# Patient Record
Sex: FEMALE | Race: WHITE | NOT HISPANIC OR LATINO | Employment: PART TIME | URBAN - METROPOLITAN AREA
[De-identification: names, ages, dates, MRNs, and addresses within clinical notes are randomized per-mention and may not be internally consistent; named-entity substitution may affect disease eponyms.]

---

## 2018-11-13 ENCOUNTER — HOSPITAL ENCOUNTER (EMERGENCY)
Facility: HOSPITAL | Age: 18
Discharge: HOME | End: 2018-11-13
Attending: EMERGENCY MEDICINE | Admitting: EMERGENCY MEDICINE

## 2018-11-13 ENCOUNTER — APPOINTMENT (OUTPATIENT)
Dept: CT IMAGING | Facility: HOSPITAL | Age: 18
End: 2018-11-13

## 2018-11-13 VITALS
TEMPERATURE: 97.8 F | DIASTOLIC BLOOD PRESSURE: 81 MMHG | RESPIRATION RATE: 18 BRPM | HEART RATE: 95 BPM | HEIGHT: 66 IN | BODY MASS INDEX: 22.5 KG/M2 | SYSTOLIC BLOOD PRESSURE: 129 MMHG | WEIGHT: 140 LBS | OXYGEN SATURATION: 100 %

## 2018-11-13 DIAGNOSIS — G43.109 MIGRAINE WITH AURA AND WITHOUT STATUS MIGRAINOSUS, NOT INTRACTABLE: Primary | ICD-10-CM

## 2018-11-13 LAB
ALBUMIN SERPL-MCNC: 4.89 G/DL (ref 3.2–4.8)
ALBUMIN/GLOB SERPL: 1.9 G/DL (ref 1.5–2.5)
ALP SERPL-CCNC: 66 U/L (ref 25–100)
ALT SERPL W P-5'-P-CCNC: 62 U/L (ref 7–40)
ANION GAP SERPL CALCULATED.3IONS-SCNC: 9 MMOL/L (ref 3–11)
AST SERPL-CCNC: 39 U/L (ref 0–33)
BASOPHILS # BLD AUTO: 0.03 10*3/MM3 (ref 0–0.2)
BASOPHILS NFR BLD AUTO: 0.3 % (ref 0–1)
BILIRUB SERPL-MCNC: 0.4 MG/DL (ref 0.3–1.2)
BUN BLD-MCNC: 8 MG/DL (ref 9–23)
BUN/CREAT SERPL: 9.8 (ref 7–25)
CALCIUM SPEC-SCNC: 9.7 MG/DL (ref 8.7–10.4)
CHLORIDE SERPL-SCNC: 110 MMOL/L (ref 99–109)
CO2 SERPL-SCNC: 20 MMOL/L (ref 20–31)
CREAT BLD-MCNC: 0.82 MG/DL (ref 0.6–1.3)
DEPRECATED RDW RBC AUTO: 42.4 FL (ref 37–54)
EOSINOPHIL # BLD AUTO: 0.05 10*3/MM3 (ref 0–0.3)
EOSINOPHIL NFR BLD AUTO: 0.5 % (ref 0–3)
ERYTHROCYTE [DISTWIDTH] IN BLOOD BY AUTOMATED COUNT: 14.6 % (ref 11.3–14.5)
GFR SERPL CREATININE-BSD FRML MDRD: 90 ML/MIN/1.73
GLOBULIN UR ELPH-MCNC: 2.5 GM/DL
GLUCOSE BLD-MCNC: 102 MG/DL (ref 70–100)
HCG INTACT+B SERPL-ACNC: <5 MIU/ML
HCT VFR BLD AUTO: 41.5 % (ref 34.5–44)
HGB BLD-MCNC: 12.9 G/DL (ref 11.5–15.5)
HOLD SPECIMEN: NORMAL
HOLD SPECIMEN: NORMAL
IMM GRANULOCYTES # BLD: 0.03 10*3/MM3 (ref 0–0.03)
IMM GRANULOCYTES NFR BLD: 0.3 % (ref 0–0.6)
LYMPHOCYTES # BLD AUTO: 2.09 10*3/MM3 (ref 0.6–4.8)
LYMPHOCYTES NFR BLD AUTO: 18.9 % (ref 24–44)
MCH RBC QN AUTO: 24.7 PG (ref 27–31)
MCHC RBC AUTO-ENTMCNC: 31.1 G/DL (ref 32–36)
MCV RBC AUTO: 79.3 FL (ref 80–99)
MONOCYTES # BLD AUTO: 0.42 10*3/MM3 (ref 0–1)
MONOCYTES NFR BLD AUTO: 3.8 % (ref 0–12)
NEUTROPHILS # BLD AUTO: 8.43 10*3/MM3 (ref 1.5–8.3)
NEUTROPHILS NFR BLD AUTO: 76.2 % (ref 41–71)
PLATELET # BLD AUTO: 328 10*3/MM3 (ref 150–450)
PMV BLD AUTO: 11.3 FL (ref 6–12)
POTASSIUM BLD-SCNC: 4.1 MMOL/L (ref 3.5–5.5)
PROT SERPL-MCNC: 7.4 G/DL (ref 5.7–8.2)
RBC # BLD AUTO: 5.23 10*6/MM3 (ref 3.89–5.14)
SODIUM BLD-SCNC: 139 MMOL/L (ref 132–146)
WBC NRBC COR # BLD: 11.05 10*3/MM3 (ref 4.5–13.5)
WHOLE BLOOD HOLD SPECIMEN: NORMAL
WHOLE BLOOD HOLD SPECIMEN: NORMAL

## 2018-11-13 PROCEDURE — 99283 EMERGENCY DEPT VISIT LOW MDM: CPT

## 2018-11-13 PROCEDURE — 84702 CHORIONIC GONADOTROPIN TEST: CPT | Performed by: EMERGENCY MEDICINE

## 2018-11-13 PROCEDURE — 70450 CT HEAD/BRAIN W/O DYE: CPT

## 2018-11-13 PROCEDURE — 36415 COLL VENOUS BLD VENIPUNCTURE: CPT

## 2018-11-13 PROCEDURE — 85025 COMPLETE CBC W/AUTO DIFF WBC: CPT | Performed by: EMERGENCY MEDICINE

## 2018-11-13 PROCEDURE — 80053 COMPREHEN METABOLIC PANEL: CPT | Performed by: EMERGENCY MEDICINE

## 2018-11-13 RX ORDER — KETOROLAC TROMETHAMINE 10 MG/1
10 TABLET, FILM COATED ORAL EVERY 6 HOURS PRN
Qty: 20 TABLET | Refills: 0 | Status: SHIPPED | OUTPATIENT
Start: 2018-11-13 | End: 2018-11-13 | Stop reason: ALTCHOICE

## 2018-11-13 RX ORDER — ONDANSETRON 4 MG/1
4 TABLET, FILM COATED ORAL ONCE
Status: COMPLETED | OUTPATIENT
Start: 2018-11-13 | End: 2018-11-13

## 2018-11-13 RX ORDER — SODIUM CHLORIDE 0.9 % (FLUSH) 0.9 %
10 SYRINGE (ML) INJECTION AS NEEDED
Status: DISCONTINUED | OUTPATIENT
Start: 2018-11-13 | End: 2018-11-13 | Stop reason: HOSPADM

## 2018-11-13 RX ORDER — METHYLPREDNISOLONE 4 MG/1
TABLET ORAL
Qty: 1 EACH | Refills: 0 | Status: SHIPPED | OUTPATIENT
Start: 2018-11-13 | End: 2021-09-17

## 2018-11-13 RX ORDER — NAPROXEN 500 MG/1
500 TABLET ORAL 2 TIMES DAILY WITH MEALS
Qty: 20 TABLET | Refills: 0 | Status: SHIPPED | OUTPATIENT
Start: 2018-11-13 | End: 2021-09-17

## 2018-11-13 RX ADMIN — ONDANSETRON 4 MG: 4 TABLET, FILM COATED ORAL at 14:52

## 2018-11-13 NOTE — ED PROVIDER NOTES
[facility] EMERGENCY DEPARTMENT    eMERGENCY dEPARTMENT eNCOUnter      Pt Name: Sara Love  MRN: [id number]  YOB: 2000  Date of evaluation: 11/13/2018  Provider: Marvin Waldrop DO    CHIEF COMPLAINT       Chief Complaint   Patient presents with   • Headache         HISTORY OF PRESENT ILLNESS  (Location/Symptom, Timing/Onset, Context/Setting, Quality, Duration, Modifying Factors, Severity.)   Sara Love is a 17 y.o. female who presents to the emergency department with complaints of migraine headaches. The patient states that she has had migraines almost every day for the past month, despite never experiencing migraines prior to the onset one month ago. The headaches are primarily on the right side. She endorses accompanying nausea and vomiting, as well as blurred vision of the left eye with aura. She denies fever, chills, hearing problems, or any illness preceding the migraines. She states that the migraines have had a significant impact on her life and induced stress, causing her to miss classes and social events. She was seen at an urgent treatment center recently and received Zofran and Tramadol. Shortly after, she was treated at  but states that blood was drawn and she was told to return on 11/26/2018 for further evaluation. Today, she had 2 episodes of nausea and vomiting and believed she would be unable to last until that appointment, prompting her to come to the ED today. She denies any other pertinent medical conditions. There are no other acute complaints at this time.      Nursing notes were reviewed.    REVIEW OF SYSTEMS    (2-9 systems for level 4, 10 or more for level 5)   ROS:  General:  No fevers, no chills, no weakness  Cardiovascular:  No chest pain, no palpitations  Respiratory:  No shortness of breath, no cough, no wheezing  Gastrointestinal:  No pain, several episodes of nausea and vomiting, no diarrhea  Musculoskeletal:  No muscle pain, no  "joint pain  Skin:  No rash, no easy bruising  Neurologic:  No speech problems, no extremity numbness, no extremity tingling, no extremity weakness. Migraine headaches. Blurred vision in the left eye with aura  Psychiatric:  No anxiety  Genitourinary:  No dysuria, no hematuria    Except as noted above the remainder of the review of systems was reviewed and negative.       PAST MEDICAL HISTORY   History reviewed. No pertinent past medical history.      SURGICAL HISTORY     History reviewed. No pertinent surgical history.      CURRENT MEDICATIONS       Current Facility-Administered Medications:   •  sodium chloride 0.9 % flush 10 mL, 10 mL, Intravenous, PRN, Marvin Waldrop, DO    Current Outpatient Medications:   •  ketorolac (TORADOL) 10 MG tablet, Take 1 tablet by mouth Every 6 (Six) Hours As Needed for Moderate Pain ., Disp: 20 tablet, Rfl: 0  •  MethylPREDNISolone (MEDROL, DUNCAN,) 4 MG tablet, Take as directed on package instructions., Disp: 1 each, Rfl: 0    ALLERGIES     Penicillins    FAMILY HISTORY     History reviewed. No pertinent family history.       SOCIAL HISTORY       Social History     Socioeconomic History   • Marital status: Single     Spouse name: Not on file   • Number of children: Not on file   • Years of education: Not on file   • Highest education level: Not on file   Tobacco Use   • Smoking status: Never Smoker   Substance and Sexual Activity   • Alcohol use: Yes   • Drug use: Yes     Types: Marijuana   • Sexual activity: Defer         PHYSICAL EXAM    (up to 7 for level 4, 8 or more for level 5)     Vitals:    11/13/18 1324   BP: 129/81   BP Location: Left arm   Patient Position: Sitting   Pulse: 95   Resp: 18   Temp: 97.8 °F (36.6 °C)   TempSrc: Oral   SpO2: 100%   Weight: 63.5 kg (140 lb)   Height: 167.6 cm (66\")       Physical Exam  General :Patient is awake, alert, oriented, in no acute distress, nontoxic appearing  HEENT: Pupils are equally round and reactive to light, EOMI, " conjunctivae clear, sclerae white, there is no injection no icterus.  Oral mucosa is moist, no exudate. Uvula is midline  Neck: Neck is supple, full range of motion, trachea midline  Cardiac: Heart regular rate, rhythm, no murmurs, rubs, or gallops  Lungs: Lungs are clear to auscultation, there is no wheezing, rhonchi, or rales. There is no use of accessory muscles.  Chest wall: There is no tenderness to palpation over the chest wall or over ribs  Abdomen: Abdomen is soft, nontender, nondistended. There is no firm or pulsatile masses, no rebound rigidity or guarding.   Musculoskeletal: 5 out of 5 strength in all 4 extremities.  No focal muscle deficits are appreciated  Neuro: Motor intact, sensory intact, level of consciousness is normal, cerebellar function is normal, reflexes are grossly normal. No evidence of incontinence or loss of bowel or bladder function, no saddle anesthesia noted. No focal deficits noted, no cranial nerve deficit. Good  strength bilaterally, normal finger-to-nose test. Good strength in all 4 extremities. Patient is ambulatory.  Dermatology: Skin is warm and dry  Psych: Mentation is grossly normal, cognition is grossly normal. Affect is appropriate.      DIAGNOSTIC RESULTS     EKG: All EKG's are interpreted by the Emergency Department Physician who either signs or Co-signs this chart in the absence of a cardiologist.    No orders to display       RADIOLOGY:   Non-plain film images such as CT, Ultrasound and MRI are read by the radiologist. Plain radiographic images are visualized and preliminarily interpreted by the emergency physician with the below findings:      [] Radiologist's Report Reviewed:  CT Head Without Contrast   Preliminary Result   No acute intracranial abnormality.        D:  11/13/2018   E:  11/13/2018                            ED BEDSIDE ULTRASOUND:   Performed by ED Physician - none    LABS:    I have reviewed and interpreted all of the currently available lab results  from this visit (if applicable):  Results for orders placed or performed during the hospital encounter of 11/13/18   Comprehensive Metabolic Panel   Result Value Ref Range    Glucose 102 (H) 70 - 100 mg/dL    BUN 8 (L) 9 - 23 mg/dL    Creatinine 0.82 0.60 - 1.30 mg/dL    Sodium 139 132 - 146 mmol/L    Potassium 4.1 3.5 - 5.5 mmol/L    Chloride 110 (H) 99 - 109 mmol/L    CO2 20.0 20.0 - 31.0 mmol/L    Calcium 9.7 8.7 - 10.4 mg/dL    Total Protein 7.4 5.7 - 8.2 g/dL    Albumin 4.89 (H) 3.20 - 4.80 g/dL    ALT (SGPT) 62 (H) 7 - 40 U/L    AST (SGOT) 39 (H) 0 - 33 U/L    Alkaline Phosphatase 66 25 - 100 U/L    Total Bilirubin 0.4 0.3 - 1.2 mg/dL    eGFR Non African Amer 90 >60 mL/min/1.73    Globulin 2.5 gm/dL    A/G Ratio 1.9 1.5 - 2.5 g/dL    BUN/Creatinine Ratio 9.8 7.0 - 25.0    Anion Gap 9.0 3.0 - 11.0 mmol/L   hCG, Quantitative, Pregnancy   Result Value Ref Range    HCG Quantitative <5.00 mIU/mL   CBC Auto Differential   Result Value Ref Range    WBC 11.05 4.50 - 13.50 10*3/mm3    RBC 5.23 (H) 3.89 - 5.14 10*6/mm3    Hemoglobin 12.9 11.5 - 15.5 g/dL    Hematocrit 41.5 34.5 - 44.0 %    MCV 79.3 (L) 80.0 - 99.0 fL    MCH 24.7 (L) 27.0 - 31.0 pg    MCHC 31.1 (L) 32.0 - 36.0 g/dL    RDW 14.6 (H) 11.3 - 14.5 %    RDW-SD 42.4 37.0 - 54.0 fl    MPV 11.3 6.0 - 12.0 fL    Platelets 328 150 - 450 10*3/mm3    Neutrophil % 76.2 (H) 41.0 - 71.0 %    Lymphocyte % 18.9 (L) 24.0 - 44.0 %    Monocyte % 3.8 0.0 - 12.0 %    Eosinophil % 0.5 0.0 - 3.0 %    Basophil % 0.3 0.0 - 1.0 %    Immature Grans % 0.3 0.0 - 0.6 %    Neutrophils, Absolute 8.43 (H) 1.50 - 8.30 10*3/mm3    Lymphocytes, Absolute 2.09 0.60 - 4.80 10*3/mm3    Monocytes, Absolute 0.42 0.00 - 1.00 10*3/mm3    Eosinophils, Absolute 0.05 0.00 - 0.30 10*3/mm3    Basophils, Absolute 0.03 0.00 - 0.20 10*3/mm3    Immature Grans, Absolute 0.03 0.00 - 0.03 10*3/mm3   Light Blue Top   Result Value Ref Range    Extra Tube hold for add-on    Green Top (Gel)   Result Value Ref Range  "   Extra Tube Hold for add-ons.    Lavender Top   Result Value Ref Range    Extra Tube hold for add-on    Gold Top - SST   Result Value Ref Range    Extra Tube Hold for add-ons.         All other labs were within normal range or not returned as of this dictation.      EMERGENCY DEPARTMENT COURSE and DIFFERENTIAL DIAGNOSIS/MDM:   Vitals:    Vitals:    11/13/18 1324   BP: 129/81   BP Location: Left arm   Patient Position: Sitting   Pulse: 95   Resp: 18   Temp: 97.8 °F (36.6 °C)   TempSrc: Oral   SpO2: 100%   Weight: 63.5 kg (140 lb)   Height: 167.6 cm (66\")         Patient with recurrent migraine type headaches over the last 4 weeks.  She states gradual onset, using a right frontal forehead region, does have associated nausea, a few episodes of vomiting, also with visual auras mainly out of her left eye.  Initial waxing and waning in nature.  No falls, injury or head trauma.  No foreign prior history of any neurological issues, no family history of any neurological complaints including tumors or aneurysms.  The patient does not appear acutely or toxic.  No acute headache during our initial examination as her symptoms have improved.  Vital signs are stable, no focal neurological deficit, no meningeal signs on examination.  She has had blood work, no prior neurological imaging.  We did obtain a CT to have without contrast today for further workup.  We also discussion would need to follow with a neurology specialist for further evaluation, possible intervention/migraine medication.  Results as above.  CT scan also unremarkable.  Patient remained headache free.  Given the recurrence of her headaches, migrainous in nature with our she would benefit from evaluation by neurology.  We will give her our neurologist team for follow-up.  Will also treat her with a steroid taper, anti-inflammatory medications to help with her symptoms in the meantime.  If she has any worsening symptoms, continuous headaches which are not " intractable or any further concerns in the meantime should return back to the hospital for reevaluation. The patient will follow-up with their PCP in 1-2 days for reevaluation.  If the patient or family members have any further concerns or patient has any worsening symptoms they will return to the ED for reevaluation.      MEDICATIONS ADMINISTERED IN ED:  Medications   sodium chloride 0.9 % flush 10 mL (not administered)   ondansetron (ZOFRAN) tablet 4 mg (4 mg Oral Given 11/13/18 5334)       PROCEDURES:  Procedures    CRITICAL CARE TIME    Total Critical Care time was 0 minutes, excluding separately reportable procedures.   There was a high probability of clinically significant/life threatening deterioration in the patient's condition which required my urgent intervention.      FINAL IMPRESSION      1. Migraine with aura and without status migrainosus, not intractable          DISPOSITION/PLAN     ED Disposition     ED Disposition Condition Comment    Discharge Stable           PATIENT REFERRED TO:  Montana Rausch MD  [address] [address]  [phone]    Schedule an appointment as soon as possible for a visit   Neurology    Your PCP at     In 2 days      [facility] Emergency Department  [address] [place] [id number] [phone]    If symptoms worsen      DISCHARGE MEDICATIONS:     Medication List      START taking these medications    ketorolac 10 MG tablet  Commonly known as:  TORADOL  Take 1 tablet by mouth Every 6 (Six) Hours As Needed for Moderate Pain .     MethylPREDNISolone 4 MG tablet  Commonly known as:  MEDROL (DUNCAN)  Take as directed on package instructions.          Comment: Please note this report has been produced using speech recognition software and may contain errors related to that system including errors in grammar, punctuation, and spelling, as well as words and phrases that may be inappropriate. If there are any  questions or concerns please feel free to contact the dictating provider for clarification.    Marvin Waldrop DO  Attending Emergency Physician    Documentation assistance provided by mayuri Estrada.  Information recorded by the scribe was done at my direction and has been verified and validated by me.             Klaus Estrada  11/13/18 1500       Marvin Waldrop DO  11/13/18 3191

## 2020-08-24 ENCOUNTER — HOSPITAL ENCOUNTER (OUTPATIENT)
Dept: OTHER | Facility: HOSPITAL | Age: 20
Discharge: HOME | End: 2020-08-24
Attending: OBSTETRICS & GYNECOLOGY

## 2020-08-27 LAB — SARS-COV-2 RNA SPEC QL NAA+PROBE: NOT DETECTED

## 2021-09-17 ENCOUNTER — OFFICE VISIT (OUTPATIENT)
Dept: FAMILY MEDICINE CLINIC | Facility: CLINIC | Age: 21
End: 2021-09-17

## 2021-09-17 VITALS
WEIGHT: 144 LBS | SYSTOLIC BLOOD PRESSURE: 120 MMHG | HEIGHT: 66 IN | HEART RATE: 65 BPM | BODY MASS INDEX: 23.14 KG/M2 | TEMPERATURE: 98 F | OXYGEN SATURATION: 97 % | DIASTOLIC BLOOD PRESSURE: 76 MMHG

## 2021-09-17 DIAGNOSIS — Z00.00 ANNUAL PHYSICAL EXAM: Primary | ICD-10-CM

## 2021-09-17 PROCEDURE — 99385 PREV VISIT NEW AGE 18-39: CPT | Performed by: NURSE PRACTITIONER

## 2021-09-17 NOTE — PROGRESS NOTES
Sara Love presents today for a physical    Chief Complaint   Patient presents with   • Annual Exam     Pt states she is not fasting today but needs a physical for [employer] employment.    • Establish Care        HPI   Pt presents today to establish care.  She also needs a physical for the [employer].  Pt has no significant PMH.    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  She is going to be at [employer].  She is graduating this December with a degree in Elementary Education.      She lives with her brother and a roommate.      She does not eat healthy.  She eats a lot of quick easy foods.  She does not exercise regularly.      She is late on her dental exam.  She is UTD on her vision.  She brushes her teeth twice a day.      She does check her skin regularly.  She uses sunscreen.      She had a pap smear in August.  She goes back in October d/t not getting enough cells.  She is on the Depo shot.  She has not been sexually active for 2 years.       There is no problem list on file for this patient.      Current Outpatient Medications   Medication Sig Dispense Refill   • estradiol cypionate (DEPO-ESTRADIOL) 5 MG/ML injection Inject  into the appropriate muscle as directed by prescriber Every 28 (Twenty-Eight) Days.       No current facility-administered medications for this visit.       Allergies   Allergen Reactions   • Penicillins Unknown (See Comments)     PT DONT KNOW REACTION        History reviewed. No pertinent past medical history.     History reviewed. No pertinent surgical history.     History reviewed. No pertinent family history.     Social History     Socioeconomic History   • Marital status: Single     Spouse name: Not on file   • Number of children: Not on file   • Years of education: Not on file   • Highest education level: Not on file   Tobacco Use   • Smoking status: Never Smoker   • Smokeless tobacco: Never Used   Substance and Sexual  "Activity   • Alcohol use: Yes   • Drug use: Yes     Types: Marijuana   • Sexual activity: Defer        Vitals:    09/17/21 1410   BP: 120/76   Pulse: 65   Temp: 98 °F (36.7 °C)   SpO2: 97%   Weight: 65.3 kg (144 lb)   Height: 167.6 cm (66\")   PainSc: 0-No pain      Body mass index is 23.24 kg/m².    Patient's Body mass index is 23.24 kg/m². indicating that she is within normal range (BMI 18.5-24.9). No BMI management plan needed..      Physical Exam  Constitutional:       Appearance: Normal appearance. She is well-developed and normal weight.   HENT:      Head: Normocephalic and atraumatic.      Right Ear: Tympanic membrane, ear canal and external ear normal.      Left Ear: Tympanic membrane, ear canal and external ear normal.      Nose: Nose normal.      Mouth/Throat:      Mouth: Mucous membranes are moist.      Pharynx: Oropharynx is clear.   Eyes:      General: Lids are normal. Lids are everted, no foreign bodies appreciated.      Extraocular Movements: Extraocular movements intact.      Conjunctiva/sclera: Conjunctivae normal.      Pupils: Pupils are equal, round, and reactive to light.   Neck:      Thyroid: No thyroid mass or thyromegaly.      Vascular: No carotid bruit.      Trachea: Trachea normal.   Cardiovascular:      Rate and Rhythm: Normal rate and regular rhythm.      Pulses: Normal pulses.      Heart sounds: Normal heart sounds.   Pulmonary:      Effort: Pulmonary effort is normal.      Breath sounds: Normal breath sounds.   Abdominal:      General: Bowel sounds are normal.      Palpations: Abdomen is soft.      Tenderness: There is no abdominal tenderness. There is no guarding or rebound.   Musculoskeletal:         General: Normal range of motion.      Cervical back: Normal range of motion and neck supple.      Comments: Strength 5/5 to BUE and BLE.   Skin:     General: Skin is warm and dry.   Neurological:      General: No focal deficit present.      Mental Status: She is alert and oriented to " person, place, and time.      Deep Tendon Reflexes: Reflexes are normal and symmetric.      Comments: Romberg negative.  DTRs +2.   Psychiatric:         Mood and Affect: Mood normal.         Behavior: Behavior normal.         Thought Content: Thought content normal.         Judgment: Judgment normal.              Results for orders placed or performed during the hospital encounter of 11/13/18   Comprehensive Metabolic Panel    Specimen: Blood   Result Value Ref Range    Glucose 102 (H) 70 - 100 mg/dL    BUN 8 (L) 9 - 23 mg/dL    Creatinine 0.82 0.60 - 1.30 mg/dL    Sodium 139 132 - 146 mmol/L    Potassium 4.1 3.5 - 5.5 mmol/L    Chloride 110 (H) 99 - 109 mmol/L    CO2 20.0 20.0 - 31.0 mmol/L    Calcium 9.7 8.7 - 10.4 mg/dL    Total Protein 7.4 5.7 - 8.2 g/dL    Albumin 4.89 (H) 3.20 - 4.80 g/dL    ALT (SGPT) 62 (H) 7 - 40 U/L    AST (SGOT) 39 (H) 0 - 33 U/L    Alkaline Phosphatase 66 25 - 100 U/L    Total Bilirubin 0.4 0.3 - 1.2 mg/dL    eGFR Non African Amer 90 >60 mL/min/1.73    Globulin 2.5 gm/dL    A/G Ratio 1.9 1.5 - 2.5 g/dL    BUN/Creatinine Ratio 9.8 7.0 - 25.0    Anion Gap 9.0 3.0 - 11.0 mmol/L   hCG, Quantitative, Pregnancy    Specimen: Blood   Result Value Ref Range    HCG Quantitative <5.00 mIU/mL   CBC Auto Differential    Specimen: Blood   Result Value Ref Range    WBC 11.05 4.50 - 13.50 10*3/mm3    RBC 5.23 (H) 3.89 - 5.14 10*6/mm3    Hemoglobin 12.9 11.5 - 15.5 g/dL    Hematocrit 41.5 34.5 - 44.0 %    MCV 79.3 (L) 80.0 - 99.0 fL    MCH 24.7 (L) 27.0 - 31.0 pg    MCHC 31.1 (L) 32.0 - 36.0 g/dL    RDW 14.6 (H) 11.3 - 14.5 %    RDW-SD 42.4 37.0 - 54.0 fl    MPV 11.3 6.0 - 12.0 fL    Platelets 328 150 - 450 10*3/mm3    Neutrophil % 76.2 (H) 41.0 - 71.0 %    Lymphocyte % 18.9 (L) 24.0 - 44.0 %    Monocyte % 3.8 0.0 - 12.0 %    Eosinophil % 0.5 0.0 - 3.0 %    Basophil % 0.3 0.0 - 1.0 %    Immature Grans % 0.3 0.0 - 0.6 %    Neutrophils, Absolute 8.43 (H) 1.50 - 8.30 10*3/mm3    Lymphocytes, Absolute 2.09  0.60 - 4.80 10*3/mm3    Monocytes, Absolute 0.42 0.00 - 1.00 10*3/mm3    Eosinophils, Absolute 0.05 0.00 - 0.30 10*3/mm3    Basophils, Absolute 0.03 0.00 - 0.20 10*3/mm3    Immature Grans, Absolute 0.03 0.00 - 0.03 10*3/mm3   Light Blue Top   Result Value Ref Range    Extra Tube hold for add-on    Green Top (Gel)   Result Value Ref Range    Extra Tube Hold for add-ons.    Lavender Top   Result Value Ref Range    Extra Tube hold for add-on    Gold Top - SST   Result Value Ref Range    Extra Tube Hold for add-ons.           There is no immunization history on file for this patient.    Health Maintenance   Topic Date Due   • TDAP/TD VACCINES (1 - Tdap) Never done   • PAP SMEAR  Never done   • INFLUENZA VACCINE  10/01/2021       Diagnoses and all orders for this visit:    1. 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  Exam is unremarkable.  --Encouraged healthy eating and getting 150 minutes a week of moderate intensity exercise.      Counseled on health maintenance topics and preventative care recommendations: cervical cancer screening, fasting labs, healthy eating (avoid sweets and excessive intake of carbs), routine physical activity (150 minutes/week of moderate intensity exercise), monthly skin exams, sunscreen use, eye exams, dental exams.       Return in about 1 year (around 9/17/2022) for Annual physical.    Haily Dumont, APRN

## 2022-03-10 ENCOUNTER — TELEPHONE (OUTPATIENT)
Dept: FAMILY MEDICINE CLINIC | Facility: CLINIC | Age: 22
End: 2022-03-10

## 2022-03-10 NOTE — TELEPHONE ENCOUNTER
Lvm to call office back regarding Haily no longer being here and to see if she wanted to establish care with another provider.

## 2022-03-24 PROCEDURE — U0004 COV-19 TEST NON-CDC HGH THRU: HCPCS | Performed by: FAMILY MEDICINE
